# Patient Record
Sex: FEMALE | Race: WHITE | URBAN - METROPOLITAN AREA
[De-identification: names, ages, dates, MRNs, and addresses within clinical notes are randomized per-mention and may not be internally consistent; named-entity substitution may affect disease eponyms.]

---

## 2021-11-09 ENCOUNTER — OFFICE VISIT (OUTPATIENT)
Dept: URBAN - METROPOLITAN AREA CLINIC 17 | Facility: CLINIC | Age: 76
End: 2021-11-09
Payer: MEDICARE

## 2021-11-09 DIAGNOSIS — H35.3112 NONEXUDATIVE AGE-RELATED MACULAR DEGENERATION OF RIGHT EYE, INTERMEDIATE DRY STAGE: ICD-10-CM

## 2021-11-09 PROCEDURE — 92004 COMPRE OPH EXAM NEW PT 1/>: CPT | Performed by: OPHTHALMOLOGY

## 2021-11-09 PROCEDURE — 92134 CPTRZ OPH DX IMG PST SGM RTA: CPT | Performed by: OPHTHALMOLOGY

## 2021-11-09 PROCEDURE — 67028 INJECTION EYE DRUG: CPT | Performed by: OPHTHALMOLOGY

## 2021-11-09 ASSESSMENT — INTRAOCULAR PRESSURE
OD: 11
OS: 12

## 2021-11-09 NOTE — IMPRESSION/PLAN
Impression: Exudative age-related macular degeneration, left eye, with active choroidal neovascularization: H35.3221. Plan: [facility] today   4w OCT/[facility]

## 2021-11-09 NOTE — IMPRESSION/PLAN
Impression: Nonexudative age-related macular degeneration of right eye, intermediate dry stage: H35.3112. Plan:  AREDS2/tammy  6m OCT/exam

## 2021-12-07 ENCOUNTER — PROCEDURE (OUTPATIENT)
Dept: URBAN - METROPOLITAN AREA CLINIC 17 | Facility: CLINIC | Age: 76
End: 2021-12-07
Payer: MEDICARE

## 2021-12-07 PROCEDURE — 92134 CPTRZ OPH DX IMG PST SGM RTA: CPT | Performed by: OPHTHALMOLOGY

## 2021-12-07 PROCEDURE — 67028 INJECTION EYE DRUG: CPT | Performed by: OPHTHALMOLOGY

## 2021-12-07 ASSESSMENT — INTRAOCULAR PRESSURE
OS: 15
OD: 15

## 2021-12-07 NOTE — IMPRESSION/PLAN
Impression: Exudative age-related macular degeneration, left eye, with active choroidal neovascularization: H35.3221. Plan: [facility] today (large heme)   2w OCT/[facility] (sample)

## 2021-12-21 ENCOUNTER — PROCEDURE (OUTPATIENT)
Dept: URBAN - METROPOLITAN AREA CLINIC 17 | Facility: CLINIC | Age: 76
End: 2021-12-21
Payer: MEDICARE

## 2021-12-21 PROCEDURE — 67028 INJECTION EYE DRUG: CPT | Performed by: OPHTHALMOLOGY

## 2021-12-21 PROCEDURE — 92134 CPTRZ OPH DX IMG PST SGM RTA: CPT | Performed by: OPHTHALMOLOGY

## 2021-12-21 ASSESSMENT — INTRAOCULAR PRESSURE
OS: 17
OD: 14

## 2021-12-21 NOTE — IMPRESSION/PLAN
Impression: Exudative age-related macular degeneration, left eye, with active choroidal neovascularization: H35.3221. Plan: [facility] (sample) today  4w OCT/[facility]

## 2022-01-18 ENCOUNTER — OFFICE VISIT (OUTPATIENT)
Dept: URBAN - METROPOLITAN AREA CLINIC 17 | Facility: CLINIC | Age: 77
End: 2022-01-18
Payer: MEDICARE

## 2022-01-18 DIAGNOSIS — H35.3221 EXUDATIVE AGE-RELATED MACULAR DEGENERATION, LEFT EYE, WITH ACTIVE CHOROIDAL NEOVASCULARIZATION: Primary | ICD-10-CM

## 2022-01-18 PROCEDURE — 67028 INJECTION EYE DRUG: CPT | Performed by: OPHTHALMOLOGY

## 2022-01-18 PROCEDURE — 92134 CPTRZ OPH DX IMG PST SGM RTA: CPT | Performed by: OPHTHALMOLOGY

## 2022-01-18 ASSESSMENT — INTRAOCULAR PRESSURE
OS: 16
OD: 16

## 2022-01-18 NOTE — IMPRESSION/PLAN
Impression: Exudative age-related macular degeneration, left eye, with active choroidal neovascularization: H35.3221. Plan: repeat [facility] today  4w OCT/[facility]

## 2022-02-15 ENCOUNTER — OFFICE VISIT (OUTPATIENT)
Dept: URBAN - METROPOLITAN AREA CLINIC 17 | Facility: CLINIC | Age: 77
End: 2022-02-15
Payer: MEDICARE

## 2022-02-15 DIAGNOSIS — H10.31 ACUTE CONJUNCTIVITIS OF RIGHT EYE: ICD-10-CM

## 2022-02-15 PROCEDURE — 92134 CPTRZ OPH DX IMG PST SGM RTA: CPT | Performed by: OPHTHALMOLOGY

## 2022-02-15 PROCEDURE — 92014 COMPRE OPH EXAM EST PT 1/>: CPT | Performed by: OPHTHALMOLOGY

## 2022-02-15 PROCEDURE — 67028 INJECTION EYE DRUG: CPT | Performed by: OPHTHALMOLOGY

## 2022-02-15 RX ORDER — NEOMYCIN SULFATE, POLYMYXIN B SULFATE AND DEXAMETHASONE 3.5; 10000; 1 MG/G; [USP'U]/G; MG/G
OINTMENT OPHTHALMIC
Qty: 1 | Refills: 0 | Status: ACTIVE
Start: 2022-02-15

## 2022-02-15 RX ORDER — NEOMYCIN SULFATE, POLYMYXIN B SULFATE AND DEXAMETHASONE 3.5; 10000; 1 MG/G; [USP'U]/G; MG/G
OINTMENT OPHTHALMIC
Qty: 0 | Refills: 0 | Status: INACTIVE
Start: 2022-02-15 | End: 2022-02-15

## 2022-02-15 ASSESSMENT — INTRAOCULAR PRESSURE
OS: 16
OD: 15

## 2022-02-15 NOTE — IMPRESSION/PLAN
Impression: Exudative age-related macular degeneration, left eye, with active choroidal neovascularization: H35.3221. Plan: Repeat [facility] today Still has heme  4w OCT/[facility]

## 2022-02-15 NOTE — IMPRESSION/PLAN
Impression: Acute conjunctivitis of right eye: H10.31. Plan:  Patient came in with red OD Drops placed and OD became increasingly chemotic No pain - other than chemosis the eye appears intact Dilated exam shows stable drusen (no sign of inflammation/infection) Etiology of this phenomenon unclear (? allergy) Start maxitrol ointment tid OD Return for check later this week  *25 mod for new exam changes OD apart from injection OS

## 2022-02-18 ENCOUNTER — OFFICE VISIT (OUTPATIENT)
Dept: URBAN - METROPOLITAN AREA CLINIC 17 | Facility: CLINIC | Age: 77
End: 2022-02-18
Payer: MEDICARE

## 2022-02-18 DIAGNOSIS — Z96.1 PRESENCE OF PSEUDOPHAKIA: ICD-10-CM

## 2022-02-18 DIAGNOSIS — H11.153 PINGUECULA, BILATERAL: ICD-10-CM

## 2022-02-18 PROCEDURE — 99203 OFFICE O/P NEW LOW 30 MIN: CPT | Performed by: OPTOMETRIST

## 2022-02-18 ASSESSMENT — INTRAOCULAR PRESSURE
OS: 14
OD: 16

## 2022-02-18 NOTE — IMPRESSION/PLAN
Impression: Acute conjunctivitis of right eye: H10.31. Plan: Resolved. Okay to discontinue medication.

## 2022-12-06 ENCOUNTER — OFFICE VISIT (OUTPATIENT)
Dept: URBAN - METROPOLITAN AREA CLINIC 17 | Facility: CLINIC | Age: 77
End: 2022-12-06
Payer: MEDICARE

## 2022-12-06 DIAGNOSIS — H35.3112 NONEXUDATIVE AGE-RELATED MACULAR DEGENERATION, RIGHT EYE, INTERMEDIATE DRY STAGE: ICD-10-CM

## 2022-12-06 DIAGNOSIS — H35.3223 EXUDATIVE AGE-REL MCLR DEGN, LEFT EYE, WITH INACTIVE SCAR: Primary | ICD-10-CM

## 2022-12-06 PROCEDURE — 92134 CPTRZ OPH DX IMG PST SGM RTA: CPT | Performed by: OPHTHALMOLOGY

## 2022-12-06 PROCEDURE — 92014 COMPRE OPH EXAM EST PT 1/>: CPT | Performed by: OPHTHALMOLOGY

## 2022-12-06 ASSESSMENT — INTRAOCULAR PRESSURE
OD: 14
OS: 16

## 2022-12-06 NOTE — IMPRESSION/PLAN
Impression: Exudative age-rel mclr degn, left eye, with inactive scar: P92.1848. Plan:  LIVE 10/4/22 Today scar formed without fluid/heme Discussed condition/plan with patient including observation vs injection. As VA stable and no activity seen, patient elects to observe. Will follow closely. Patient agrees with plan. OCT/optos ordered today. 
 1m OCT/[facility] OS ONLY

## 2022-12-06 NOTE — IMPRESSION/PLAN
Impression: Nonexudative age-related macular degeneration of right eye, intermediate dry stage: H35.3112. Plan: Discussed condition/plan with patient including AREDS2/amsler use. No treatment required today. Will follow closely. Patient agrees with plan. OCT/optos ordered today. 

 6m OCT/exam

## 2023-01-03 ENCOUNTER — OFFICE VISIT (OUTPATIENT)
Dept: URBAN - METROPOLITAN AREA CLINIC 17 | Facility: CLINIC | Age: 78
End: 2023-01-03
Payer: MEDICARE

## 2023-01-03 DIAGNOSIS — H35.3223 EXUDATIVE AGE-REL MCLR DEGN, LEFT EYE, WITH INACTIVE SCAR: Primary | ICD-10-CM

## 2023-01-03 PROCEDURE — 92134 CPTRZ OPH DX IMG PST SGM RTA: CPT | Performed by: OPHTHALMOLOGY

## 2023-01-03 PROCEDURE — 92014 COMPRE OPH EXAM EST PT 1/>: CPT | Performed by: OPHTHALMOLOGY

## 2023-01-03 ASSESSMENT — INTRAOCULAR PRESSURE
OD: 11
OS: 13

## 2023-01-03 NOTE — IMPRESSION/PLAN
Impression: Exudative age-rel mclr degn, left eye, with inactive scar: U34.4362. Plan: Stable exam, dry OCT
 LIVE 10/4/22 Today scar formed without fluid/heme Discussed condition/plan with patient including observation vs injection. As VA stable and no activity seen, patient elects to observe. Will follow closely. Patient agrees with plan. OCT/optos ordered today. 
 2m OCT/[facility] OS ONLY

## 2023-02-28 ENCOUNTER — OFFICE VISIT (OUTPATIENT)
Dept: URBAN - METROPOLITAN AREA CLINIC 17 | Facility: CLINIC | Age: 78
End: 2023-02-28
Payer: MEDICARE

## 2023-02-28 DIAGNOSIS — H35.3112 NONEXUDATIVE AGE-RELATED MACULAR DEGENERATION OF RIGHT EYE, INTERMEDIATE DRY STAGE: ICD-10-CM

## 2023-02-28 DIAGNOSIS — H35.3223 EXUDATIVE AGE-RELATED MACULAR DEGENERATION, LEFT EYE, WITH INACTIVE SCAR: Primary | ICD-10-CM

## 2023-02-28 PROCEDURE — 92134 CPTRZ OPH DX IMG PST SGM RTA: CPT | Performed by: OPHTHALMOLOGY

## 2023-02-28 PROCEDURE — 92014 COMPRE OPH EXAM EST PT 1/>: CPT | Performed by: OPHTHALMOLOGY

## 2023-02-28 ASSESSMENT — INTRAOCULAR PRESSURE
OD: 14
OS: 14

## 2023-02-28 NOTE — IMPRESSION/PLAN
Impression: Exudative age-rel mclr degn, left eye, with inactive scar: P99.8133. Plan: Stable exam, dry OCT
 LIVE 10/4/22 Stable scar formed without fluid/heme Discussed condition/plan with patient including observation vs injection. As VA stable and no activity seen, patient elects to observe. Will follow closely. Patient agrees with plan. OCT/optos ordered today. 
 Patient traveling - will follow up with established retina specialist at her destination in 2-3 months or sooner if changes

## 2023-02-28 NOTE — IMPRESSION/PLAN
Impression: Nonexudative age-related macular degeneration of right eye, intermediate dry stage: H35.3112. Plan: Discussed condition/plan with patient including AREDS2/amsler use. No treatment required today. Will follow closely. Patient agrees with plan. OCT/optos ordered today.

## 2024-01-02 ENCOUNTER — OFFICE VISIT (OUTPATIENT)
Dept: URBAN - METROPOLITAN AREA CLINIC 17 | Facility: CLINIC | Age: 79
End: 2024-01-02
Payer: MEDICARE

## 2024-01-02 DIAGNOSIS — H43.813 VITREOUS DEGENERATION, BILATERAL: ICD-10-CM

## 2024-01-02 DIAGNOSIS — H35.3223 EXUDATIVE AGE-REL MCLR DEGN, LEFT EYE, WITH INACTIVE SCAR: Primary | ICD-10-CM

## 2024-01-02 DIAGNOSIS — H35.3112 NONEXUDATIVE AGE-RELATED MACULAR DEGENERATION OF RIGHT EYE, INTERMEDIATE DRY STAGE: ICD-10-CM

## 2024-01-02 PROCEDURE — 92014 COMPRE OPH EXAM EST PT 1/>: CPT | Performed by: OPHTHALMOLOGY

## 2024-01-02 PROCEDURE — 92134 CPTRZ OPH DX IMG PST SGM RTA: CPT | Performed by: OPHTHALMOLOGY

## 2024-01-02 ASSESSMENT — INTRAOCULAR PRESSURE
OS: 13
OD: 12

## 2024-03-05 ENCOUNTER — OFFICE VISIT (OUTPATIENT)
Dept: URBAN - METROPOLITAN AREA CLINIC 17 | Facility: CLINIC | Age: 79
End: 2024-03-05
Payer: MEDICARE

## 2024-03-05 DIAGNOSIS — H35.3221 EXUDATIVE AGE-RELATED MACULAR DEGENERATION, LEFT EYE, WITH ACTIVE CHOROIDAL NEOVASCULARIZATION: Primary | ICD-10-CM

## 2024-03-05 PROCEDURE — 92134 CPTRZ OPH DX IMG PST SGM RTA: CPT | Performed by: OPHTHALMOLOGY

## 2024-03-05 PROCEDURE — 92014 COMPRE OPH EXAM EST PT 1/>: CPT | Performed by: OPHTHALMOLOGY

## 2024-03-05 PROCEDURE — 67028 INJECTION EYE DRUG: CPT | Performed by: OPHTHALMOLOGY

## 2024-03-05 ASSESSMENT — INTRAOCULAR PRESSURE
OD: 13
OS: 15

## 2024-04-09 ENCOUNTER — OFFICE VISIT (OUTPATIENT)
Dept: URBAN - METROPOLITAN AREA CLINIC 17 | Facility: CLINIC | Age: 79
End: 2024-04-09
Payer: MEDICARE

## 2024-04-09 DIAGNOSIS — H35.3221 EXUDATIVE AGE-RELATED MACULAR DEGENERATION, LEFT EYE, WITH ACTIVE CHOROIDAL NEOVASCULARIZATION: Primary | ICD-10-CM

## 2024-04-09 PROCEDURE — 67028 INJECTION EYE DRUG: CPT | Performed by: OPHTHALMOLOGY

## 2024-04-09 PROCEDURE — 92134 CPTRZ OPH DX IMG PST SGM RTA: CPT | Performed by: OPHTHALMOLOGY

## 2024-04-09 ASSESSMENT — INTRAOCULAR PRESSURE
OS: 16
OD: 14

## 2025-01-07 ENCOUNTER — OFFICE VISIT (OUTPATIENT)
Dept: URBAN - METROPOLITAN AREA CLINIC 17 | Facility: CLINIC | Age: 80
End: 2025-01-07
Payer: MEDICARE

## 2025-01-07 DIAGNOSIS — H35.3231 BILATERAL EXUDATIVE AGE-RELATED MACULAR DEGENERATION W/ ACTIVE CHOROIDAL NEOVASCULARIZATION: Primary | ICD-10-CM

## 2025-01-07 PROCEDURE — 92134 CPTRZ OPH DX IMG PST SGM RTA: CPT | Performed by: OPHTHALMOLOGY

## 2025-01-07 PROCEDURE — 92014 COMPRE OPH EXAM EST PT 1/>: CPT | Performed by: OPHTHALMOLOGY

## 2025-01-07 ASSESSMENT — INTRAOCULAR PRESSURE
OS: 14
OD: 16

## 2025-02-11 ENCOUNTER — OFFICE VISIT (OUTPATIENT)
Dept: URBAN - METROPOLITAN AREA CLINIC 17 | Facility: CLINIC | Age: 80
End: 2025-02-11
Payer: MEDICARE

## 2025-02-11 DIAGNOSIS — H35.3231 EXUDATIVE AGE-RELATED MACULAR DEGENERATION, BILATERAL, WITH ACTIVE CHOROIDAL NEOVASCULARIZATION: Primary | ICD-10-CM

## 2025-02-11 PROCEDURE — 92134 CPTRZ OPH DX IMG PST SGM RTA: CPT | Performed by: OPHTHALMOLOGY

## 2025-02-11 PROCEDURE — 67028 INJECTION EYE DRUG: CPT | Performed by: OPHTHALMOLOGY

## 2025-02-11 ASSESSMENT — INTRAOCULAR PRESSURE
OS: 12
OD: 11

## 2025-03-25 ENCOUNTER — OFFICE VISIT (OUTPATIENT)
Dept: URBAN - METROPOLITAN AREA CLINIC 17 | Facility: CLINIC | Age: 80
End: 2025-03-25
Payer: MEDICARE

## 2025-03-25 DIAGNOSIS — H35.3231 EXUDATIVE AGE-RELATED MACULAR DEGENERATION, BILATERAL, WITH ACTIVE CHOROIDAL NEOVASCULARIZATION: Primary | ICD-10-CM

## 2025-03-25 PROCEDURE — 67028 INJECTION EYE DRUG: CPT | Performed by: OPHTHALMOLOGY

## 2025-03-25 PROCEDURE — 92134 CPTRZ OPH DX IMG PST SGM RTA: CPT | Performed by: OPHTHALMOLOGY

## 2025-03-25 ASSESSMENT — INTRAOCULAR PRESSURE
OS: 12
OD: 12